# Patient Record
Sex: MALE | Race: OTHER | HISPANIC OR LATINO | URBAN - METROPOLITAN AREA
[De-identification: names, ages, dates, MRNs, and addresses within clinical notes are randomized per-mention and may not be internally consistent; named-entity substitution may affect disease eponyms.]

---
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
## 2021-05-11 NOTE — ED PROVIDER NOTE - CPE EDP CARDIAC NORM
normal... Gabapentin Counseling: I discussed with the patient the risks of gabapentin including but not limited to dizziness, somnolence, fatigue and ataxia.

## 2022-06-20 NOTE — ED PROVIDER NOTE - NO PERTINENT FAMILY HISTORY IN FIRST DEGREE RELATIVES OF:
Detail Level: Simple
Render Risk Assessment In Note?: no
Comment: Still not adequately controlled with dupixent, but slight improvement; repeated flares, interfering with ADLs despite appropriate use of therapy
no pertinent

## 2022-08-02 NOTE — ED PROVIDER NOTE - NSFOLLOWUPINSTRUCTIONS_ED_ALL_ED_FT
Palliative:  patient has advanced dementia and is awating placement. He is not in patient hospice appropriate. please return in 1 week (3/30) for final rabies shot.   return to ER sooner for fevers/chills signs of infection at bite site or foaming at mouth.
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
## 2023-09-25 NOTE — ED PROVIDER NOTE - PROGRESS NOTE DETAILS
none MD Chani: No acute fracture, able to ambulate, will d/c home with strict return precautions, f/u with primary medical doctor and ortho. discussed with patient and mom in room.

## 2023-12-16 NOTE — ED PROVIDER NOTE - CONSTITUTIONAL APPEARANCE HYGIENE, MLM
"-- DO NOT REPLY / DO NOT REPLY ALL --  -- Message is from [employer] ([address] A REFILL MEDICATION ENCOUNTER    Med Refill  Is the patient currently having any symptoms?: No/Non-Emergent symptoms    Name of medication requested: See pended med    Is this the first request for the medication in the last 48 hours?: Yes      Patient is requesting a medication refill - medication is on active list    Full name of the provider who ordered the [facility] site name / Account # for provider: [address] [address] [address] [place] [address] Rd     Preferred Pharmacy: Pharmacy  [facility] [address] [place] [address]    Patient confirmed the above pharmacy as correct? Yes    Caller Information         Type Contact Phone/Fax    12/16/2023 11:43 AM CST Phone (Incoming) Julienne Duffy (Self) [phone] (M)            Alternative phone number: n/a    Can a detailed message be left?: Yes    Patient is completely out of medication: Verify if patient is currently experiencing symptoms. If patient is symptomatic, proceed with front end triage instead of medication refill. If patient is not symptomatic but is completely out of medication, guillermo as High priority when routing. Inform patient: âPlease call back with any questions or concerns and if your condition becomes life threatening, you should seek immediate medical assistance by calling 911 or going to the Emergency Department for evaluation. â    Inform all patients: ""If the clinical team needs to contact you regarding this refill, please be aware the return phone call may come from an unidentified or out of state phone number and your refill request will be addressed as soon as the clinical team reviews your message. \""    " well appearing
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
## 2024-04-19 NOTE — ED PROVIDER NOTE - PMH
Wt Readings from Last 3 Encounters:   04/18/24 127 kg (280 lb)   04/02/24 125 kg (276 lb 6.4 oz)   01/08/24 119 kg (262 lb)              No pertinent past medical history